# Patient Record
Sex: MALE
[De-identification: names, ages, dates, MRNs, and addresses within clinical notes are randomized per-mention and may not be internally consistent; named-entity substitution may affect disease eponyms.]

---

## 2022-12-30 PROBLEM — Z00.00 ENCOUNTER FOR PREVENTIVE HEALTH EXAMINATION: Status: ACTIVE | Noted: 2022-12-30

## 2023-01-30 ENCOUNTER — APPOINTMENT (OUTPATIENT)
Dept: NEUROSURGERY | Facility: CLINIC | Age: 38
End: 2023-01-30
Payer: MEDICAID

## 2023-01-30 VITALS — WEIGHT: 230 LBS | HEIGHT: 68 IN | BODY MASS INDEX: 34.86 KG/M2

## 2023-01-30 DIAGNOSIS — Z83.3 FAMILY HISTORY OF DIABETES MELLITUS: ICD-10-CM

## 2023-01-30 DIAGNOSIS — F41.9 ANXIETY DISORDER, UNSPECIFIED: ICD-10-CM

## 2023-01-30 DIAGNOSIS — Z86.59 PERSONAL HISTORY OF OTHER MENTAL AND BEHAVIORAL DISORDERS: ICD-10-CM

## 2023-01-30 DIAGNOSIS — Z78.9 OTHER SPECIFIED HEALTH STATUS: ICD-10-CM

## 2023-01-30 DIAGNOSIS — M54.50 LOW BACK PAIN, UNSPECIFIED: ICD-10-CM

## 2023-01-30 PROCEDURE — 99203 OFFICE O/P NEW LOW 30 MIN: CPT

## 2023-01-30 RX ORDER — IBUPROFEN 800 MG/1
800 TABLET, FILM COATED ORAL
Qty: 60 | Refills: 0 | Status: ACTIVE | COMMUNITY
Start: 2023-01-19

## 2023-01-30 RX ORDER — OXYCODONE AND ACETAMINOPHEN 10; 325 MG/1; MG/1
10-325 TABLET ORAL
Qty: 45 | Refills: 0 | Status: ACTIVE | COMMUNITY
Start: 2023-01-26

## 2023-01-30 RX ORDER — BLOOD SUGAR DIAGNOSTIC
STRIP MISCELLANEOUS
Qty: 2 | Refills: 0 | Status: ACTIVE | COMMUNITY
Start: 2022-10-24

## 2023-01-30 RX ORDER — BUPROPION HYDROCHLORIDE 150 MG/1
150 TABLET, EXTENDED RELEASE ORAL
Qty: 30 | Refills: 0 | Status: ACTIVE | COMMUNITY
Start: 2023-01-18

## 2023-01-30 RX ORDER — MIRTAZAPINE 30 MG/1
30 TABLET, FILM COATED ORAL
Qty: 30 | Refills: 0 | Status: ACTIVE | COMMUNITY
Start: 2023-01-18

## 2023-01-30 RX ORDER — BACLOFEN 10 MG/1
10 TABLET ORAL
Qty: 30 | Refills: 0 | Status: ACTIVE | COMMUNITY
Start: 2023-01-19

## 2023-02-01 NOTE — ASSESSMENT
[FreeTextEntry1] : Mr. HALL presents with chronic lower back pain with neuropathy in the left leg s/p L5/S1 TLIF (2016, by Dr. Mueller at [facility]). He was referred today by his pain specialist for a neurosurgical consultation since he is on chronic narcotics. He will proceed with an MRI lumbar spine without gado and lumbar xrays to include flexion / extension views. I will see him back once testing is completed. \par \par Maria Del Carmen Cohn MS PA-C\par Claudia Calvin MD \par \par \par
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
## 2023-04-17 ENCOUNTER — APPOINTMENT (OUTPATIENT)
Dept: NEUROSURGERY | Facility: CLINIC | Age: 38
End: 2023-04-17

## 2023-05-24 ENCOUNTER — APPOINTMENT (OUTPATIENT)
Dept: NEUROSURGERY | Facility: CLINIC | Age: 38
End: 2023-05-24
Payer: MEDICAID

## 2023-05-24 VITALS — HEIGHT: 68 IN | BODY MASS INDEX: 32.89 KG/M2 | WEIGHT: 217 LBS

## 2023-05-24 DIAGNOSIS — M47.816 SPONDYLOSIS W/OUT MYELOPATHY OR RADICULOPATHY, LUMBAR REGION: ICD-10-CM

## 2023-05-24 PROCEDURE — 99214 OFFICE O/P EST MOD 30 MIN: CPT

## 2023-05-24 NOTE — ASSESSMENT
[FreeTextEntry1] : This is a 38-year-old gentleman who presents for neurosurgical revisit with regards to isolated lumbago.  He has a history of lumbar fusion at L5-S1 for which updated imaging and testing has been reviewed.  Studies reveal stable fusion with adjacent segment facet arthropathy at L3-4, L4-5. I have discussed with the patient that he is a candidate to consider lumbar interventional procedures through his pain management specialist.  He can consider a L3-5 MBB for consideration of RFA. Goal of the procedure advised and he is agreeable to consider.\par \par A referral has been provided with our recommended treatment course.\par \par Patient expresses understanding and will exhaust such efforts. He is aware that based on his reports, there is no indication for neurosurgical intervention at this time.\par \par He can return as needed for additional input.\par \par Sydni Jessica PA-C \par Claudia Calvin MD\par

## 2023-05-24 NOTE — HISTORY OF PRESENT ILLNESS
[FreeTextEntry1] : This is a 38-year-old gentleman who presents for neurosurgical revisit, he is accompanied by his daughter.  As a review, he has a history of lumbar fusion at L5-S1.  Despite such efforts he continues to report chronic low back pain with which remains mostly isolated and limits his ability for flexion and extension.  Overall activity remains quite limited and he is frustrated with regards to his condition.  He continues with the use of Percocet 10/325 1 tablet by mouth every 8 hours as needed for breakthrough pain mild to moderate temporary benefit noted through such efforts.\par \par MR L spine and X-ray L Spine obtained, reported viewed as patient does not have CD and we are unable to access [facility]. Study revealed stable fusion at L5-S1 without hardware complication. Facet arthrosis noted at L3-4, L4-5\par  \par He denies any recent interventional procedure or Dr. Devries.\par \par PHYSICAL EXAM: \par \par Constitutional: Well appearing, no distress\par HEENT: Normocephalic Atraumatic\par Psychiatric: Alert and oriented to all spheres, normal mood\par Pulmonary: No respiratory distress\par \par Neurologic: \par CN II-XII grossly intact\par Palpation: (+) lumbar paravertebral tenderness to palpation.\par Strength: Full strength in all major muscle groups\par Sensation: Full sensation to light touch in all extremities\par Reflexes: \par  2+ patellar\par  2+ ankle jerk\par \par ROM liminted with flex/ext\par \par Signs:\par SLR negative\par \par Gait: steady, walking w/o assistance.\par